# Patient Record
Sex: FEMALE | Race: WHITE | NOT HISPANIC OR LATINO | Employment: FULL TIME | URBAN - METROPOLITAN AREA
[De-identification: names, ages, dates, MRNs, and addresses within clinical notes are randomized per-mention and may not be internally consistent; named-entity substitution may affect disease eponyms.]

---

## 2017-12-13 ENCOUNTER — OFFICE VISIT (OUTPATIENT)
Dept: URGENT CARE | Facility: CLINIC | Age: 32
End: 2017-12-13
Payer: COMMERCIAL

## 2017-12-13 PROCEDURE — 99203 OFFICE O/P NEW LOW 30 MIN: CPT

## 2018-01-23 VITALS
HEIGHT: 64 IN | DIASTOLIC BLOOD PRESSURE: 88 MMHG | WEIGHT: 178 LBS | SYSTOLIC BLOOD PRESSURE: 130 MMHG | OXYGEN SATURATION: 100 % | BODY MASS INDEX: 30.39 KG/M2 | TEMPERATURE: 98.6 F | HEART RATE: 85 BPM | RESPIRATION RATE: 16 BRPM

## 2018-01-24 NOTE — PROGRESS NOTES
Assessment    1  Acute frontal sinusitis (461 1) (J01 10)    Plan  Acute frontal sinusitis    · Amoxicillin 875 MG Oral Tablet; TAKE 1 TABLET EVERY 12 HOURS DAILY    Discussion/Summary  Discussion Summary:   Discussed dx of sinusitis and will treat with amoxicillin and follow up with PCP in 3-5 days  Medication Side Effects Reviewed: Possible side effects of new medications were reviewed with the patient/guardian today  Understands and agrees with treatment plan: The treatment plan was reviewed with the patient/guardian  The patient/guardian understands and agrees with the treatment plan   Counseling Documentation With Imm: The patient was counseled regarding instructions for management, patient and family education, importance of compliance with treatment  total time of encounter was 25 minutes and 10 minutes was spent counseling  Follow Up Instructions: Follow Up with your Primary Care Provider in 3-5 days  If your symptoms worsen, go to the nearest [place] [address] Emergency Department  Chief Complaint    1  Cold Symptoms  Chief Complaint Free Text Note Form: Cough and sinus pressure x 1 week  11 weeks gestation      History of Present Illness  HPI: 28year old female at urgent care with chief complaint of cough sinus pressure and congestion for 1 week has not used nay OTC medications   Hospital Based Practices Required Assessment:   Pain Assessment   the patient states they have pain  The pain is located in the sinus  The patient describes the pain as dull  (on a scale of 0 to 10, the patient rates the pain at 3 )   Abuse And Domestic Violence Screen    Yes, the patient is safe at home  The patient states no one is hurting them  Depression And Suicide Screen  No, the patient has not had thoughts of hurting themself  No, the patient has not felt depressed in the past 7 days  Readiness To Learn: Receptive  Barriers To Learning: none     Preferred Learning: verbal   Education Completed: disease/condition   Teaching Method: verbal   Person Taught: patient   Evaluation Of Learning: verbalized/demonstrated understanding      Review of Systems  Focused-Female:   Constitutional: No fever, no chills, feels well, no tiredness, no recent weight gain or loss  ENT: nasal discharge, but as noted in HPI  Cardiovascular: no complaints of slow or fast heart rate, no chest pain, no palpitations, no leg claudication or lower extremity edema  Respiratory: no complaints of shortness of breath, no wheezing, no dyspnea on exertion, no orthopnea or PND  Breasts: no complaints of breast pain, breast lump or nipple discharge  Gastrointestinal: no complaints of abdominal pain, no constipation, no nausea or diarrhea, no vomiting, no bloody stools  Genitourinary: no complaints of dysuria, no incontinence, no pelvic pain, no dysmenorrhea, no vaginal discharge or abnormal vaginal bleeding  Musculoskeletal: no complaints of arthralgia, no myalgia, no joint swelling or stiffness, no limb pain or swelling  Integumentary: no complaints of skin rash or lesion, no itching or dry skin, no skin wounds  Neurological: no complaints of headache, no confusion, no numbness or tingling, no dizziness or fainting  ROS Reviewed:   ROS reviewed  Past Medical History  Active Problems And Past Medical History Reviewed: The active problems and past medical history were reviewed and updated today  Family History  Family History Reviewed: The family history was reviewed and updated today  Social History  Social History Reviewed: The social history was reviewed and updated today  The social history was reviewed and is unchanged  Surgical History  Surgical History Reviewed: The surgical history was reviewed and updated today  Current Meds   1  Levothyroxine Sodium 75 MCG Oral Tablet; Therapy: (Recorded:[id number]) to Recorded   2  PrenaPlus TABS;    Therapy: (Recorded:[id number]) to Recorded  Medication List Reviewed: The medication list was reviewed and updated today  Allergies    1  Cefzil TABS   2  Dimetapp ELIX   3  Ibuprofen 200 TABS    Vitals  Signs   Recorded: [id number] 02:57PM   Temperature: 98 6 F  Heart Rate: 85  Respiration: 16  Systolic: 246  Diastolic: 88  Height: 5 ft 4 in  Weight: 178 lb   BMI Calculated: 30 55  BSA Calculated: 1 86  O2 Saturation: 100    Physical Exam    Constitutional   General appearance: No acute distress, well appearing and well nourished  Eyes   Conjunctiva and lids: No swelling, erythema or discharge  Pupils and irises: Equal, round and reactive to light  Ears, Nose, Mouth, and Throat   External inspection of ears and nose: Normal     Otoscopic examination: Tympanic membranes translucent with normal light reflex  Canals patent without erythema  Nasal mucosa, septum, and turbinates: Abnormal   normal nasal septum, no intranasal masses or polyps and normal nasal turbinates  There was a purulent discharge from both nares  The bilateral nasal mucosa was boggy and edematous  Oropharynx: Normal with no erythema, edema, exudate or lesions  Pulmonary   Respiratory effort: No increased work of breathing or signs of respiratory distress  Auscultation of lungs: Clear to auscultation  Cardiovascular   Palpation of heart: Normal PMI, no thrills  Auscultation of heart: Normal rate and rhythm, normal S1 and S2, without murmurs  Lymphatic   Palpation of lymph nodes in neck: Abnormal   bilateral mobile anterior cervical node enlargement     Musculoskeletal   Gait and station: Normal     Psychiatric   Orientation to person, place, and time: Normal     Mood and affect: Normal        Signatures   Electronically signed by : Maribel Lambert NP; Dec 13 2017  3:07PM EST                       (Author)    Electronically signed by : LAVERNE Alcantar ; Dec 14 2017 12:38PM EST                       (Co-author)

## 2018-09-07 ENCOUNTER — APPOINTMENT (OUTPATIENT)
Dept: LAB | Facility: HOSPITAL | Age: 33
End: 2018-09-07
Payer: COMMERCIAL

## 2018-09-07 ENCOUNTER — TRANSCRIBE ORDERS (OUTPATIENT)
Dept: ADMINISTRATIVE | Facility: HOSPITAL | Age: 33
End: 2018-09-07

## 2018-09-07 LAB — TSH SERPL DL<=0.05 MIU/L-ACNC: 0.54 UIU/ML (ref 0.36–3.74)

## 2018-09-07 PROCEDURE — 84443 ASSAY THYROID STIM HORMONE: CPT

## 2018-09-07 PROCEDURE — 36415 COLL VENOUS BLD VENIPUNCTURE: CPT

## 2018-11-19 ENCOUNTER — OFFICE VISIT (OUTPATIENT)
Dept: URGENT CARE | Facility: CLINIC | Age: 33
End: 2018-11-19
Payer: COMMERCIAL

## 2018-11-19 VITALS
RESPIRATION RATE: 20 BRPM | HEIGHT: 60 IN | TEMPERATURE: 97 F | BODY MASS INDEX: 36.71 KG/M2 | OXYGEN SATURATION: 99 % | DIASTOLIC BLOOD PRESSURE: 66 MMHG | SYSTOLIC BLOOD PRESSURE: 102 MMHG | HEART RATE: 94 BPM | WEIGHT: 187 LBS

## 2018-11-19 DIAGNOSIS — J01.40 ACUTE NON-RECURRENT PANSINUSITIS: Primary | ICD-10-CM

## 2018-11-19 PROCEDURE — 99213 OFFICE O/P EST LOW 20 MIN: CPT | Performed by: NURSE PRACTITIONER

## 2018-11-19 RX ORDER — AMOXICILLIN 500 MG/1
500 CAPSULE ORAL EVERY 8 HOURS SCHEDULED
Qty: 21 CAPSULE | Refills: 0 | Status: SHIPPED | COMMUNITY
Start: 2018-11-19 | End: 2018-11-26

## 2018-11-19 NOTE — PATIENT INSTRUCTIONS
Rhinosinusitis   WHAT YOU NEED TO KNOW:   Rhinosinusitis (RS) is inflammation of your nose and sinuses  It commonly begins as a virus, often as a common cold  Viruses usually last 7 to 10 days and do not need treatment  When the virus does not get better on its own, you may have bacterial RS  This means that bacteria have begun to grow inside your sinuses  Acute RS lasts less than 4 weeks  Chronic RS lasts 12 weeks or more  Recurrent RS is when you have 4 or more episodes of RS in one year  DISCHARGE INSTRUCTIONS:   Return to the emergency department if:   · Your eye and eyelid are red, swollen, and painful  · You cannot open your eye  · You have double vision or you cannot see  · Your eyeball bulges out or you cannot move your eye  · You are more sleepy than normal or you notice changes in your ability to think, move, or talk  · You have a stiff neck, a fever, or a bad headache  · You have swelling of your forehead or scalp  Contact your healthcare provider if:   · Your symptoms are worse or do not improve after 3 to 5 days of treatment  · You have questions or concerns about your condition or care  Medicines: You may need any of the following:  · Acetaminophen  decreases pain and fever  It is available without a doctor's order  Ask how much to take and how often to take it  Follow directions  Acetaminophen can cause liver damage if not taken correctly  · NSAIDs , such as ibuprofen, help decrease swelling, pain, and fever  This medicine is available with or without a doctor's order  NSAIDs can cause stomach bleeding or kidney problems in certain people  If you take blood thinner medicine, always ask your healthcare provider if NSAIDs are safe for you  Always read the medicine label and follow directions  · Nasal steroid sprays  decrease inflammation in your nose and sinuses  · Decongestants  reduce swelling and drain mucus in the nose and sinuses   They may help you breathe easier  · Antihistamines  dry mucus in the nose and relieve sneezing  · Antibiotics  treat a bacterial infection and may be needed if your symptoms do not improve or they get worse  · Take your medicine as directed  Contact your healthcare provider if you think your medicine is not helping or if you have side effects  Tell him or her if you are allergic to any medicine  Keep a list of the medicines, vitamins, and herbs you take  Include the amounts, and when and why you take them  Bring the list or the pill bottles to follow-up visits  Carry your medicine list with you in case of an emergency  Self-care:   · Rinse your sinuses  Use a sinus rinse device to rinse your nasal passages with a saline (salt water) solution  This will help thin the mucus in your nose and rinse away pollen and dirt  It will also help reduce swelling so you can breathe normally  Ask your healthcare provider how often to do this  · Breathe in steam   Heat a bowl of water until you see steam  Lean over the bowl and make a tent over your head with a large towel  Breathe deeply for about 20 minutes  Be careful not to get too close to the steam or burn yourself  Do this 3 times a day  You can also breathe deeply when you take a hot shower  · Sleep with your head elevated  Place an extra pillow under your head before you go to sleep to help your sinuses drain  · Drink liquids as directed  Ask your healthcare provider how much liquid to drink each day and which liquids are best for you  Liquids will thin the mucus in your nose and help it drain  Avoid drinks that contain alcohol or caffeine  · Do not smoke, and avoid secondhand smoke  Nicotine and other chemicals in cigarettes and cigars can make your symptoms worse  Ask your healthcare provider for information if you currently smoke and need help to quit  E-cigarettes or smokeless tobacco still contain nicotine   Talk to your healthcare provider before you use these products  Follow up with your healthcare provider as directed: Follow up if your symptoms are worse or not better after 3 to 5 days of treatment  Write down your questions so you remember to ask them during your visits  © 2017 [employer] Information is for End User's use only and may not be sold, redistributed or otherwise used for commercial purposes  All illustrations and images included in CareNotes® are the copyrighted property of [employer]  or [employer]  The above information is an  only  It is not intended as medical advice for individual conditions or treatments  Talk to your doctor, nurse or pharmacist before following any medical regimen to see if it is safe and effective for you

## 2018-11-19 NOTE — PROGRESS NOTES
[facility]        NAME: Jere Moreno is a 35 y o  female  : 1985    MRN: [id number]  DATE: 2018  TIME: 3:15 PM    Assessment and Plan   Acute non-recurrent pansinusitis [J01 40]  1  Acute non-recurrent pansinusitis  amoxicillin (AMOXIL) 500 mg capsule         Patient Instructions   Saline nasal spray as needed  Flonase nasal spray as directed  Follow up with PCP in 3-5 days  Proceed to  ER if symptoms worsen  Chief Complaint     Chief Complaint   Patient presents with    Cold Like Symptoms     cough and congestion for 5 days         History of Present Illness       Sinusitis   This is a new problem  The current episode started in the past 7 days  The problem is unchanged  Associated symptoms include congestion, headaches (Frontal), a hoarse voice, sinus pressure and swollen glands  Past treatments include saline sprays  The treatment provided no relief  Review of Systems   Review of Systems   Constitutional: Negative  HENT: Positive for congestion, hoarse voice, postnasal drip, rhinorrhea and sinus pressure  Eyes: Negative  Respiratory: Negative  Cardiovascular: Negative  Gastrointestinal: Negative  Endocrine: Negative  Genitourinary: Negative  Musculoskeletal: Negative  Neurological: Positive for headaches (Frontal)           Current Medications       Current Outpatient Prescriptions:     Cyanocobalamin (B-12 DOTS) 500 MCG TBDP, Take 2 tablets by mouth , Disp: , Rfl:     levonorgestrel (MIRENA) 20 MCG/24HR IUD, by Intrauterine route , Disp: , Rfl:     levothyroxine 75 mcg tablet, Take 75 mcg by mouth , Disp: , Rfl:     amoxicillin (AMOXIL) 500 mg capsule, Take 1 capsule (500 mg total) by mouth every 8 (eight) hours for 7 days, Disp: 21 capsule, Rfl: 0    Current Allergies     Allergies as of 2018 - Reviewed 2018   Allergen Reaction Noted    Cefprozil Rash 2016            The following portions of the patient's history were reviewed and updated as appropriate: allergies, current medications, past family history, past medical history, past social history, past surgical history and problem list      History reviewed  No pertinent past medical history  Past Surgical History:   Procedure Laterality Date    GASTRIC BYPASS         History reviewed  No pertinent family history  Medications have been verified  Objective   /66   Pulse 94   Temp (!) 97 °F (36 1 °C) (Tympanic)   Resp 20   Ht 5' (1 524 m)   Wt 84 8 kg (187 lb)   SpO2 99%   Breastfeeding? Yes   BMI 36 52 kg/m²        Physical Exam     Physical Exam   Constitutional: She is oriented to person, place, and time  She appears well-developed and well-nourished  No distress  HENT:   Head: Normocephalic and atraumatic  Right Ear: External ear normal    Left Ear: External ear normal    Nose: Mucosal edema and rhinorrhea present  Right sinus exhibits maxillary sinus tenderness and frontal sinus tenderness  Left sinus exhibits maxillary sinus tenderness and frontal sinus tenderness  Mouth/Throat: Oropharynx is clear and moist    Eyes: Pupils are equal, round, and reactive to light  Conjunctivae and EOM are normal    Neck: Normal range of motion  Neck supple  Cardiovascular: Normal rate, regular rhythm and normal heart sounds  Pulmonary/Chest: Effort normal and breath sounds normal  No respiratory distress  She has no wheezes  She has no rales  Abdominal: Soft  Bowel sounds are normal    Lymphadenopathy:     She has cervical adenopathy  Neurological: She is alert and oriented to person, place, and time  She has normal reflexes  Skin: Skin is warm and dry  She is not diaphoretic  Psychiatric: She has a normal mood and affect  Her behavior is normal  Judgment and thought content normal    Nursing note and vitals reviewed

## 2018-12-14 ENCOUNTER — OFFICE VISIT (OUTPATIENT)
Dept: URGENT CARE | Facility: CLINIC | Age: 33
End: 2018-12-14
Payer: COMMERCIAL

## 2018-12-14 VITALS
HEART RATE: 86 BPM | BODY MASS INDEX: 36.32 KG/M2 | OXYGEN SATURATION: 98 % | HEIGHT: 60 IN | SYSTOLIC BLOOD PRESSURE: 100 MMHG | DIASTOLIC BLOOD PRESSURE: 72 MMHG | WEIGHT: 185 LBS | TEMPERATURE: 97 F | RESPIRATION RATE: 20 BRPM

## 2018-12-14 DIAGNOSIS — J32.9 RHINOSINUSITIS: Primary | ICD-10-CM

## 2018-12-14 DIAGNOSIS — J31.0 RHINOSINUSITIS: Primary | ICD-10-CM

## 2018-12-14 PROCEDURE — 99213 OFFICE O/P EST LOW 20 MIN: CPT | Performed by: NURSE PRACTITIONER

## 2018-12-14 RX ORDER — IBUPROFEN 200 MG
1 CAPSULE ORAL
COMMUNITY

## 2018-12-14 RX ORDER — FOLIC ACID 1 MG/1
1 TABLET ORAL
COMMUNITY

## 2018-12-14 NOTE — PROGRESS NOTES
[facility]        NAME: Michell Calle is a 35 y o  female  : 1985    MRN: [id number]  DATE: 2018  TIME: 3:14 PM    Assessment and Plan   Rhinosinusitis [J32 9]  1  Rhinosinusitis           Patient Instructions   Flonase nasal spray as directed  Continue using Neti pot as needed  Use topical nasal decongestant for no more than 72 hours  Use Sudafed as needed for no more than 72 hours  Follow up with PCP in 3-5 days  Proceed to  ER if symptoms worsen  Chief Complaint     Chief Complaint   Patient presents with    Cough     cough,congestion for 4 weeks and right ear pain for 1 week         History of Present Illness       Year old female with a chief complaint of nasal congestion  This is been present for about a month  She was seen here a month ago and given a prescription for amoxicillin  She reports that most of her symptoms have improved in that she is no longer experiencing severe headaches and she states that the congestion is not quite as bad as it had been  She began experiencing some pain in her ears about a week ago  Right side more than left  She denies fevers  She is currently breast-feeding and not taking any over-the-counter medications besides using the Neti pot  When she was here last month I advised that she use Flonase also  She never picked it up  Review of Systems   Review of Systems   Constitutional: Negative  HENT: Positive for congestion, ear pain and postnasal drip  Eyes: Negative  Respiratory: Positive for cough (Minimal)  Negative for chest tightness, shortness of breath and wheezing  Cardiovascular: Negative  Gastrointestinal: Negative  Genitourinary: Negative  Musculoskeletal: Negative  Allergic/Immunologic: Positive for environmental allergies  Neurological: Negative  Psychiatric/Behavioral: Negative            Current Medications       Current Outpatient Prescriptions:     B Complex-Biotin-FA (SUPER B-100) TABS, Take by mouth, Disp: , Rfl:     calcium carbonate (OS-BISI) 1250 (500 Ca) MG tablet, Take 1 tablet by mouth, Disp: , Rfl:     Cholecalciferol (VITAMIN D3) 99460 units TABS, Take 1 tablet by mouth, Disp: , Rfl:     Cyanocobalamin (B-12 DOTS) 500 MCG TBDP, Take 2 tablets by mouth , Disp: , Rfl:     folic acid (FOLVITE) 1 mg tablet, Take 1 mg by mouth, Disp: , Rfl:     IRON PO, Take 1 tablet by mouth, Disp: , Rfl:     levothyroxine 75 mcg tablet, Take 25 mcg by mouth  , Disp: , Rfl:     Omega-3 Fatty Acids (FISH OIL PO), Take by mouth, Disp: , Rfl:     levonorgestrel (MIRENA) 20 MCG/24HR IUD, by Intrauterine route , Disp: , Rfl:     Current Allergies     Allergies as of 12/14/2018 - Reviewed 12/14/2018   Allergen Reaction Noted    Cefprozil Rash 04/12/2016            The following portions of the patient's history were reviewed and updated as appropriate: allergies, current medications, past family history, past medical history, past social history, past surgical history and problem list      Past Medical History:   Diagnosis Date    Disease of thyroid gland        Past Surgical History:   Procedure Laterality Date    GASTRIC BYPASS         History reviewed  No pertinent family history  Medications have been verified  Objective   /72   Pulse 86   Temp (!) 97 °F (36 1 °C) (Tympanic)   Resp 20   Ht 5' (1 524 m)   Wt 83 9 kg (185 lb)   SpO2 98%   Breastfeeding? Yes   BMI 36 13 kg/m²        Physical Exam     Physical Exam   Constitutional: She is oriented to person, place, and time  She appears well-developed and well-nourished  No distress  HENT:   Head: Normocephalic  Right Ear: Tympanic membrane is bulging  Tympanic membrane is not erythematous  A middle ear effusion is present  Left Ear: Tympanic membrane is bulging  Tympanic membrane is not erythematous  A middle ear effusion is present  Nose: Mucosal edema and rhinorrhea present   Right sinus exhibits no maxillary sinus tenderness and no frontal sinus tenderness  Left sinus exhibits no frontal sinus tenderness  Mouth/Throat: Posterior oropharyngeal erythema present  No oropharyngeal exudate or posterior oropharyngeal edema  Neck: Normal range of motion  Neck supple  Cardiovascular: Normal rate, regular rhythm and normal heart sounds  Pulmonary/Chest: Breath sounds normal  No respiratory distress  She has no wheezes  She has no rales  Abdominal: Soft  Bowel sounds are normal    Neurological: She is alert and oriented to person, place, and time  She has normal reflexes  No cranial nerve deficit  Coordination normal    Skin: Skin is warm and dry  No rash noted  She is not diaphoretic  No erythema  No pallor  Psychiatric: She has a normal mood and affect  Her behavior is normal  Judgment and thought content normal    Nursing note and vitals reviewed

## 2019-01-30 ENCOUNTER — TRANSCRIBE ORDERS (OUTPATIENT)
Dept: ADMINISTRATIVE | Facility: HOSPITAL | Age: 34
End: 2019-01-30

## 2019-01-30 DIAGNOSIS — R55 NEAR SYNCOPE: Primary | ICD-10-CM

## 2019-02-05 ENCOUNTER — HOSPITAL ENCOUNTER (OUTPATIENT)
Dept: NON INVASIVE DIAGNOSTICS | Facility: HOSPITAL | Age: 34
Discharge: HOME | End: 2019-02-05
Payer: COMMERCIAL

## 2019-02-05 DIAGNOSIS — R55 NEAR SYNCOPE: ICD-10-CM

## 2019-02-05 PROCEDURE — 93306 TTE W/DOPPLER COMPLETE: CPT | Performed by: INTERNAL MEDICINE

## 2019-02-05 PROCEDURE — 93306 TTE W/DOPPLER COMPLETE: CPT

## 2019-11-05 ENCOUNTER — OFFICE VISIT (OUTPATIENT)
Dept: URGENT CARE | Facility: CLINIC | Age: 34
End: 2019-11-05
Payer: COMMERCIAL

## 2019-11-05 VITALS
SYSTOLIC BLOOD PRESSURE: 130 MMHG | OXYGEN SATURATION: 99 % | WEIGHT: 169 LBS | TEMPERATURE: 97.9 F | BODY MASS INDEX: 33.18 KG/M2 | DIASTOLIC BLOOD PRESSURE: 80 MMHG | HEIGHT: 60 IN | HEART RATE: 78 BPM | RESPIRATION RATE: 18 BRPM

## 2019-11-05 DIAGNOSIS — J01.90 ACUTE SINUSITIS, RECURRENCE NOT SPECIFIED, UNSPECIFIED LOCATION: Primary | ICD-10-CM

## 2019-11-05 PROCEDURE — 99213 OFFICE O/P EST LOW 20 MIN: CPT | Performed by: PHYSICIAN ASSISTANT

## 2019-11-05 RX ORDER — PREDNISONE 10 MG/1
TABLET ORAL
Qty: 26 TABLET | Refills: 0 | Status: SHIPPED | OUTPATIENT
Start: 2019-11-05 | End: 2019-12-22 | Stop reason: ALTCHOICE

## 2019-11-05 RX ORDER — AMOXICILLIN AND CLAVULANATE POTASSIUM 875; 125 MG/1; MG/1
1 TABLET, FILM COATED ORAL EVERY 12 HOURS SCHEDULED
Qty: 20 TABLET | Refills: 0 | Status: SHIPPED | OUTPATIENT
Start: 2019-11-05 | End: 2019-11-15

## 2019-11-06 NOTE — PROGRESS NOTES
[facility]    NAME: Tenzin Hough is a 29 y o  female  : 1985    MRN: [id number]  DATE: 2019  TIME: 8:57 PM    Assessment and Plan   Acute sinusitis, recurrence not specified, unspecified location [J01 90]  1  Acute sinusitis, recurrence not specified, unspecified location  amoxicillin-clavulanate (AUGMENTIN) 875-125 mg per tablet    predniSONE 10 mg tablet       Patient Instructions     Patient Instructions   I have prescribed an antibiotic for the infection  Please take the antibiotic as prescribed and finish the entire prescription  I recommend that the patient takes an over the counter probiotic or eats yogurt with live cultures in it [place]) to keep good bacteria in the gut and help prevent diarrhea  Wash hands frequently to prevent the spread of infection  Can use over the counter cough and cold medications to help with symptoms  Ibuprofen and/or tylenol as needed for pain or fever  If not improving over the next 7-10 days, follow up with PCP  Chief Complaint     Chief Complaint   Patient presents with    Cold Like Symptoms     Patient c/o stuffy nose, cough, headache, and chest tightness x 2 weeks       History of Present Illness   51-year-old female here with complaint of sinus congestion, cough, nasal congestion for the last 2 weeks  States she has been using over-the-counter medications with no relief  No fever chills  Review of Systems   Review of Systems   Constitutional: Negative for appetite change, chills and fever  HENT: Positive for congestion, postnasal drip and sinus pressure  Negative for ear discharge, ear pain, facial swelling, sneezing and sore throat  Respiratory: Positive for cough  Negative for shortness of breath and wheezing  Neurological: Positive for headaches         Current Medications     Current Outpatient Medications:     B Complex-Biotin-FA (SUPER B-100) TABS, Take by mouth, Disp: , Rfl:     calcium carbonate (OS-BISI) 1250 (500 Ca) MG tablet, Take 1 tablet by mouth, Disp: , Rfl:     Cholecalciferol (VITAMIN D3) 22800 units TABS, Take 1 tablet by mouth, Disp: , Rfl:     Cyanocobalamin (B-12 DOTS) 500 MCG TBDP, Take 2 tablets by mouth , Disp: , Rfl:     folic acid (FOLVITE) 1 mg tablet, Take 1 mg by mouth, Disp: , Rfl:     levonorgestrel (MIRENA) 20 MCG/24HR IUD, by Intrauterine route , Disp: , Rfl:     amoxicillin-clavulanate (AUGMENTIN) 875-125 mg per tablet, Take 1 tablet by mouth every 12 (twelve) hours for 10 days, Disp: 20 tablet, Rfl: 0    IRON PO, Take 1 tablet by mouth, Disp: , Rfl:     levothyroxine 75 mcg tablet, Take 25 mcg by mouth  , Disp: , Rfl:     Omega-3 Fatty Acids (FISH OIL PO), Take by mouth, Disp: , Rfl:     predniSONE 10 mg tablet, Take 3 tabs BID X 2 days, 2 tabs BID X 2 days, 1 tab BID X 2 days, 1 tab daily X 2 days, Disp: 26 tablet, Rfl: 0    Current Allergies     Allergies as of 11/05/2019 - Reviewed 11/05/2019   Allergen Reaction Noted    Brompheniramine-phenylephrine  12/13/2017    Cephapirin Hives 05/28/2013    Ibuprofen  04/03/2009    Pseudoephedrine  03/26/2002    Brompheniramine Palpitations 07/26/2013    Cefprozil Rash 07/26/2013    Cephalosporins Rash 05/13/2005          The following portions of the patient's history were reviewed and updated as appropriate: allergies, current medications, past family history, past medical history, past social history, past surgical history and problem list    Past Medical History:   Diagnosis Date    Disease of thyroid gland      Past Surgical History:   Procedure Laterality Date    GASTRIC BYPASS       Family History   Problem Relation Age of Onset    Autoimmune disease Mother     No Known Problems Father      Social History     Socioeconomic History    Marital status: /Civil Union     Spouse name: Not on file    Number of children: Not on file    Years of education: Not on file    Highest education level: Not on file Occupational History    Not on file   Social Needs    Financial resource strain: Not on file    Food insecurity:     Worry: Not on file     Inability: Not on file    Transportation needs:     Medical: Not on file     Non-medical: Not on file   Tobacco Use    Smoking status: Never Smoker    Smokeless tobacco: Never Used   Substance and Sexual Activity    Alcohol use: No    Drug use: No    Sexual activity: Not on file   Lifestyle    Physical activity:     Days per week: Not on file     Minutes per session: Not on file    Stress: Not on file   Relationships    Social connections:     Talks on phone: Not on file     Gets together: Not on file     Attends Judaism service: Not on file     Active member of club or organization: Not on file     Attends meetings of clubs or organizations: Not on file     Relationship status: Not on file    Intimate partner violence:     Fear of current or ex partner: Not on file     Emotionally abused: Not on file     Physically abused: Not on file     Forced sexual activity: Not on file   Other Topics Concern    Not on file   Social History Narrative    Not on file     Medications have been verified  Objective   /80   Pulse 78   Temp 97 9 °F (36 6 °C) (Tympanic)   Resp 18   Ht 5' (1 524 m)   Wt 76 7 kg (169 lb)   LMP 10/15/2019   SpO2 99%   BMI 33 01 kg/m²      Physical Exam   Physical Exam   Constitutional: She appears well-developed and well-nourished  No distress  HENT:   Head: Normocephalic and atraumatic  Right Ear: A middle ear effusion is present  Left Ear: A middle ear effusion is present  Nose: Mucosal edema present  No rhinorrhea  Right sinus exhibits maxillary sinus tenderness  Right sinus exhibits no frontal sinus tenderness  Left sinus exhibits maxillary sinus tenderness  Left sinus exhibits no frontal sinus tenderness  Mouth/Throat: Posterior oropharyngeal erythema present  No oropharyngeal exudate or posterior oropharyngeal edema  Eyes: Conjunctivae are normal    Cardiovascular: Normal rate, regular rhythm and normal heart sounds  No murmur heard  Pulmonary/Chest: Effort normal and breath sounds normal    Nursing note and vitals reviewed

## 2019-12-22 ENCOUNTER — OFFICE VISIT (OUTPATIENT)
Dept: URGENT CARE | Facility: CLINIC | Age: 34
End: 2019-12-22
Payer: COMMERCIAL

## 2019-12-22 VITALS
HEART RATE: 69 BPM | TEMPERATURE: 97.8 F | OXYGEN SATURATION: 97 % | DIASTOLIC BLOOD PRESSURE: 82 MMHG | SYSTOLIC BLOOD PRESSURE: 128 MMHG | RESPIRATION RATE: 18 BRPM

## 2019-12-22 DIAGNOSIS — J01.41 ACUTE RECURRENT PANSINUSITIS: Primary | ICD-10-CM

## 2019-12-22 PROCEDURE — 99213 OFFICE O/P EST LOW 20 MIN: CPT | Performed by: NURSE PRACTITIONER

## 2019-12-22 RX ORDER — PREDNISONE 10 MG/1
TABLET ORAL
Qty: 26 TABLET | Refills: 0 | Status: SHIPPED | OUTPATIENT
Start: 2019-12-22

## 2019-12-22 RX ORDER — AMOXICILLIN AND CLAVULANATE POTASSIUM 875; 125 MG/1; MG/1
1 TABLET, FILM COATED ORAL EVERY 12 HOURS SCHEDULED
Qty: 20 TABLET | Refills: 0 | Status: SHIPPED | OUTPATIENT
Start: 2019-12-22 | End: 2020-01-01

## 2019-12-22 NOTE — PATIENT INSTRUCTIONS
Take the augmentin and prednisone as ordered until completed  Eat yogurt or take a probiotic to restore good bacteria to your gut  Over the counter medications may be used to treat symptoms  Sinusitis   AMBULATORY CARE:   Sinusitis  is inflammation or infection of your sinuses  It is most often caused by a virus  Acute sinusitis may last up to 12 weeks  Chronic sinusitis lasts longer than 12 weeks  Recurrent sinusitis means you have 4 or more times in 1 year  Common symptoms include the following:   · Fever    · Pain, pressure, redness, or swelling around the forehead, cheeks, or eyes    · Thick yellow or green discharge from your nose    · Tenderness when you touch your face over your sinuses    · Dry cough that happens mostly at night or when you lie down    · Headache and face pain that is worse when you lean forward    · Tooth pain, or pain when you chew  Seek care immediately if:   · Your eye and eyelid are red, swollen, and painful  · You cannot open your eye  · You have vision changes, such as double vision  · Your eyeball bulges out or you cannot move your eye  · You are more sleepy than normal, or you notice changes in your ability to think, move, or talk  · You have a stiff neck, a fever, or a bad headache  · You have swelling of your forehead or scalp  Contact your healthcare provider if:   · Your symptoms do not improve after 3 days  · Your symptoms do not go away after 10 days  · You have nausea and are vomiting  · Your nose is bleeding  · You have questions or concerns about your condition or care  Treatment for sinusitis:  Your symptoms may go away on their own  Your healthcare provider may recommend watchful waiting for up to 10 days before starting antibiotics  You may  need any of the following:  · Acetaminophen  decreases pain and fever  It is available without a doctor's order  Ask how much to take and how often to take it  Follow directions   Read the labels of all other medicines you are using to see if they also contain acetaminophen, or ask your doctor or pharmacist  Acetaminophen can cause liver damage if not taken correctly  Do not use more than 4 grams (4,000 milligrams) total of acetaminophen in one day  · NSAIDs , such as ibuprofen, help decrease swelling, pain, and fever  This medicine is available with or without a doctor's order  NSAIDs can cause stomach bleeding or kidney problems in certain people  If you take blood thinner medicine, always ask your healthcare provider if NSAIDs are safe for you  Always read the medicine label and follow directions  · Nasal steroid sprays  may help decrease inflammation in your nose and sinuses  · Decongestants  help reduce swelling and drain mucus in the nose and sinuses  They may help you breathe easier  · Antihistamines  help dry mucus in the nose and relieve sneezing  · Antibiotics  help treat or prevent a bacterial infection  · Take your medicine as directed  Contact your healthcare provider if you think your medicine is not helping or if you have side effects  Tell him or her if you are allergic to any medicine  Keep a list of the medicines, vitamins, and herbs you take  Include the amounts, and when and why you take them  Bring the list or the pill bottles to follow-up visits  Carry your medicine list with you in case of an emergency  Self-care:   · Rinse your sinuses  Use a sinus rinse device to rinse your nasal passages with a saline (salt water) solution or distilled water  Do not use tap water  This will help thin the mucus in your nose and rinse away pollen and dirt  It will also help reduce swelling so you can breathe normally  Ask your healthcare provider how often to do this  · Breathe in steam   Heat a bowl of water until you see steam  Lean over the bowl and make a tent over your head with a large towel  Breathe deeply for about 20 minutes   Be careful not to get too close to the steam or burn yourself  Do this 3 times a day  You can also breathe deeply when you take a hot shower  · Sleep with your head elevated  Place an extra pillow under your head before you go to sleep to help your sinuses drain  · Drink liquids as directed  Ask your healthcare provider how much liquid to drink each day and which liquids are best for you  Liquids will thin the mucus in your nose and help it drain  Avoid drinks that contain alcohol or caffeine  · Do not smoke, and avoid secondhand smoke  Nicotine and other chemicals in cigarettes and cigars can make your symptoms worse  Ask your healthcare provider for information if you currently smoke and need help to quit  E-cigarettes or smokeless tobacco still contain nicotine  Talk to your healthcare provider before you use these products  Prevent the spread of germs that cause sinusitis:  Wash your hands often with soap and water  Wash your hands after you use the bathroom, change a child's diaper, or sneeze  Wash your hands before you prepare or eat food  Follow up with your healthcare provider as directed: You may be referred to an ear, nose, and throat specialist  Write down your questions so you remember to ask them during your visits  © 2017 [address] Information is for End User's use only and may not be sold, redistributed or otherwise used for commercial purposes  All illustrations and images included in CareNotes® are the copyrighted property of [employer]  or [employer]  The above information is an  only  It is not intended as medical advice for individual conditions or treatments  Talk to your doctor, nurse or pharmacist before following any medical regimen to see if it is safe and effective for you

## 2019-12-22 NOTE — PROGRESS NOTES
[facility]        NAME: Yariel Nazario is a 29 y o  female  : 1985    MRN: [id number]  DATE: 2019  TIME: 1:14 PM    Assessment and Plan   Acute recurrent pansinusitis [J01 41]  1  Acute recurrent pansinusitis  predniSONE 10 mg tablet    amoxicillin-clavulanate (AUGMENTIN) 875-125 mg per tablet         Patient Instructions     Patient Instructions     Take the augmentin and prednisone as ordered until completed  Eat yogurt or take a probiotic to restore good bacteria to your gut  Over the counter medications may be used to treat symptoms  Sinusitis   AMBULATORY CARE:   Sinusitis  is inflammation or infection of your sinuses  It is most often caused by a virus  Acute sinusitis may last up to 12 weeks  Chronic sinusitis lasts longer than 12 weeks  Recurrent sinusitis means you have 4 or more times in 1 year  Common symptoms include the following:   · Fever    · Pain, pressure, redness, or swelling around the forehead, cheeks, or eyes    · Thick yellow or green discharge from your nose    · Tenderness when you touch your face over your sinuses    · Dry cough that happens mostly at night or when you lie down    · Headache and face pain that is worse when you lean forward    · Tooth pain, or pain when you chew  Seek care immediately if:   · Your eye and eyelid are red, swollen, and painful  · You cannot open your eye  · You have vision changes, such as double vision  · Your eyeball bulges out or you cannot move your eye  · You are more sleepy than normal, or you notice changes in your ability to think, move, or talk  · You have a stiff neck, a fever, or a bad headache  · You have swelling of your forehead or scalp  Contact your healthcare provider if:   · Your symptoms do not improve after 3 days  · Your symptoms do not go away after 10 days  · You have nausea and are vomiting  · Your nose is bleeding      · You have questions or concerns about your condition or care  Treatment for sinusitis:  Your symptoms may go away on their own  Your healthcare provider may recommend watchful waiting for up to 10 days before starting antibiotics  You may  need any of the following:  · Acetaminophen  decreases pain and fever  It is available without a doctor's order  Ask how much to take and how often to take it  Follow directions  Read the labels of all other medicines you are using to see if they also contain acetaminophen, or ask your doctor or pharmacist  Acetaminophen can cause liver damage if not taken correctly  Do not use more than 4 grams (4,000 milligrams) total of acetaminophen in one day  · NSAIDs , such as ibuprofen, help decrease swelling, pain, and fever  This medicine is available with or without a doctor's order  NSAIDs can cause stomach bleeding or kidney problems in certain people  If you take blood thinner medicine, always ask your healthcare provider if NSAIDs are safe for you  Always read the medicine label and follow directions  · Nasal steroid sprays  may help decrease inflammation in your nose and sinuses  · Decongestants  help reduce swelling and drain mucus in the nose and sinuses  They may help you breathe easier  · Antihistamines  help dry mucus in the nose and relieve sneezing  · Antibiotics  help treat or prevent a bacterial infection  · Take your medicine as directed  Contact your healthcare provider if you think your medicine is not helping or if you have side effects  Tell him or her if you are allergic to any medicine  Keep a list of the medicines, vitamins, and herbs you take  Include the amounts, and when and why you take them  Bring the list or the pill bottles to follow-up visits  Carry your medicine list with you in case of an emergency  Self-care:   · Rinse your sinuses  Use a sinus rinse device to rinse your nasal passages with a saline (salt water) solution or distilled water  Do not use tap water   This will help thin the mucus in your nose and rinse away pollen and dirt  It will also help reduce swelling so you can breathe normally  Ask your healthcare provider how often to do this  · Breathe in steam   Heat a bowl of water until you see steam  Lean over the bowl and make a tent over your head with a large towel  Breathe deeply for about 20 minutes  Be careful not to get too close to the steam or burn yourself  Do this 3 times a day  You can also breathe deeply when you take a hot shower  · Sleep with your head elevated  Place an extra pillow under your head before you go to sleep to help your sinuses drain  · Drink liquids as directed  Ask your healthcare provider how much liquid to drink each day and which liquids are best for you  Liquids will thin the mucus in your nose and help it drain  Avoid drinks that contain alcohol or caffeine  · Do not smoke, and avoid secondhand smoke  Nicotine and other chemicals in cigarettes and cigars can make your symptoms worse  Ask your healthcare provider for information if you currently smoke and need help to quit  E-cigarettes or smokeless tobacco still contain nicotine  Talk to your healthcare provider before you use these products  Prevent the spread of germs that cause sinusitis:  Wash your hands often with soap and water  Wash your hands after you use the bathroom, change a child's diaper, or sneeze  Wash your hands before you prepare or eat food  Follow up with your healthcare provider as directed: You may be referred to an ear, nose, and throat specialist  Write down your questions so you remember to ask them during your visits  © 2017 [address] Information is for End User's use only and may not be sold, redistributed or otherwise used for commercial purposes  All illustrations and images included in CareNotes® are the copyrighted property of A [employer] M [employer] Inc  or [facility]  The above information is an  only   It is not intended as medical advice for individual conditions or treatments  Talk to your doctor, nurse or pharmacist before following any medical regimen to see if it is safe and effective for you  Follow up with PCP in 3-5 days  Proceed to  ER if symptoms worsen  Chief Complaint     Chief Complaint   Patient presents with    Cough     Pt c/o a cough and sore throat for a week  History of Present Illness       Patient reports sinus congestion, postnasal drip, ear pressure, sore throat and cough x1 week getting worse not better  The symptoms are not relieved by over-the-counter medications  She presents to be seen      Review of Systems   Review of Systems   HENT: Positive for congestion, ear pain, rhinorrhea, sinus pressure, sinus pain and sore throat  Negative for ear discharge  Respiratory: Positive for cough  Negative for chest tightness, shortness of breath and wheezing  Gastrointestinal: Positive for nausea  Negative for abdominal pain, diarrhea and vomiting  All other systems reviewed and are negative          Current Medications       Current Outpatient Medications:     amoxicillin-clavulanate (AUGMENTIN) 875-125 mg per tablet, Take 1 tablet by mouth every 12 (twelve) hours for 10 days, Disp: 20 tablet, Rfl: 0    B Complex-Biotin-FA (SUPER B-100) TABS, Take by mouth, Disp: , Rfl:     calcium carbonate (OS-BISI) 1250 (500 Ca) MG tablet, Take 1 tablet by mouth, Disp: , Rfl:     Cholecalciferol (VITAMIN D3) 58637 units TABS, Take 1 tablet by mouth, Disp: , Rfl:     Cyanocobalamin (B-12 DOTS) 500 MCG TBDP, Take 2 tablets by mouth , Disp: , Rfl:     folic acid (FOLVITE) 1 mg tablet, Take 1 mg by mouth, Disp: , Rfl:     IRON PO, Take 1 tablet by mouth, Disp: , Rfl:     levonorgestrel (MIRENA) 20 MCG/24HR IUD, by Intrauterine route , Disp: , Rfl:     levothyroxine 75 mcg tablet, Take 25 mcg by mouth  , Disp: , Rfl:     Omega-3 Fatty Acids (FISH OIL PO), Take by mouth, Disp: , Rfl:     predniSONE 10 mg tablet, Take 3 tabs BID X 2 days, 2 tabs BID X 2 days, 1 tab BID X 2 days, 1 tab daily X 2 days, Disp: 26 tablet, Rfl: 0    Current Allergies     Allergies as of 12/22/2019 - Reviewed 12/22/2019   Allergen Reaction Noted    Brompheniramine-phenylephrine  12/13/2017    Cephapirin Hives 05/28/2013    Ibuprofen  04/03/2009    Pseudoephedrine  03/26/2002    Brompheniramine Palpitations 07/26/2013    Cefprozil Rash 07/26/2013    Cephalosporins Rash 05/13/2005            The following portions of the patient's history were reviewed and updated as appropriate: allergies, current medications, past family history, past medical history, past social history, past surgical history and problem list      Past Medical History:   Diagnosis Date    Disease of thyroid gland        Past Surgical History:   Procedure Laterality Date    GASTRIC BYPASS         Family History   Problem Relation Age of Onset    Autoimmune disease Mother     No Known Problems Father          Medications have been verified  Objective   /82   Pulse 69   Temp 97 8 °F (36 6 °C)   Resp 18   SpO2 97%        Physical Exam     Physical Exam   Constitutional: She is oriented to person, place, and time  She appears well-developed and well-nourished  No distress  HENT:   Head: Normocephalic and atraumatic  Right Ear: Hearing, tympanic membrane, external ear and ear canal normal    Left Ear: Hearing, tympanic membrane, external ear and ear canal normal    Nose: Mucosal edema, rhinorrhea and sinus tenderness present  Right sinus exhibits maxillary sinus tenderness and frontal sinus tenderness  Left sinus exhibits maxillary sinus tenderness and frontal sinus tenderness  Mouth/Throat: Uvula is midline and mucous membranes are normal  Posterior oropharyngeal erythema present  No oropharyngeal exudate, posterior oropharyngeal edema or tonsillar abscesses  Tonsils are 1+ on the right  Tonsils are 1+ on the left   No tonsillar exudate  Eyes: Pupils are equal, round, and reactive to light  Neck: Normal range of motion  Neck supple  Cardiovascular: Normal rate, regular rhythm and normal heart sounds  Pulmonary/Chest: Effort normal and breath sounds normal  No accessory muscle usage or stridor  No apnea, no tachypnea and no bradypnea  No respiratory distress  She has no decreased breath sounds  She has no wheezes  She has no rhonchi  She has no rales  Abdominal: Soft  Bowel sounds are normal  She exhibits no distension  There is no tenderness  Musculoskeletal: Normal range of motion  Neurological: She is alert and oriented to person, place, and time  Skin: Skin is warm and dry  Capillary refill takes less than 2 seconds  She is not diaphoretic  Psychiatric: She has a normal mood and affect  Her behavior is normal  Judgment and thought content normal    Nursing note and vitals reviewed

## 2020-06-22 ENCOUNTER — TRANSCRIBE ORDERS (OUTPATIENT)
Dept: ADMINISTRATIVE | Facility: HOSPITAL | Age: 35
End: 2020-06-22

## 2020-06-22 DIAGNOSIS — M51.24 DISPLACEMENT OF THORACIC INTERVERTEBRAL DISC WITHOUT MYELOPATHY: Primary | ICD-10-CM

## 2020-06-26 ENCOUNTER — HOSPITAL ENCOUNTER (OUTPATIENT)
Dept: MRI IMAGING | Facility: HOSPITAL | Age: 35
Discharge: HOME | End: 2020-06-26
Payer: COMMERCIAL

## 2020-06-26 DIAGNOSIS — M51.24 DISPLACEMENT OF THORACIC INTERVERTEBRAL DISC WITHOUT MYELOPATHY: ICD-10-CM

## 2020-06-26 PROCEDURE — 72148 MRI LUMBAR SPINE W/O DYE: CPT

## 2021-01-13 ENCOUNTER — IMMUNIZATIONS (OUTPATIENT)
Dept: FAMILY MEDICINE CLINIC | Facility: HOSPITAL | Age: 36
End: 2021-01-13

## 2021-01-13 DIAGNOSIS — Z23 ENCOUNTER FOR IMMUNIZATION: ICD-10-CM

## 2021-01-13 PROCEDURE — 91301 SARS-COV-2 / COVID-19 MRNA VACCINE (MODERNA) 100 MCG: CPT

## 2021-01-13 PROCEDURE — 0011A SARS-COV-2 / COVID-19 MRNA VACCINE (MODERNA) 100 MCG: CPT

## 2021-02-12 ENCOUNTER — IMMUNIZATIONS (OUTPATIENT)
Dept: FAMILY MEDICINE CLINIC | Facility: HOSPITAL | Age: 36
End: 2021-02-12

## 2021-02-12 DIAGNOSIS — Z23 ENCOUNTER FOR IMMUNIZATION: Primary | ICD-10-CM

## 2021-02-12 PROCEDURE — 91301 SARS-COV-2 / COVID-19 MRNA VACCINE (MODERNA) 100 MCG: CPT

## 2021-02-12 PROCEDURE — 0012A SARS-COV-2 / COVID-19 MRNA VACCINE (MODERNA) 100 MCG: CPT

## 2021-06-18 ENCOUNTER — OFFICE VISIT (OUTPATIENT)
Dept: URGENT CARE | Facility: CLINIC | Age: 36
End: 2021-06-18
Payer: COMMERCIAL

## 2021-06-18 VITALS
OXYGEN SATURATION: 98 % | BODY MASS INDEX: 33.01 KG/M2 | HEART RATE: 78 BPM | DIASTOLIC BLOOD PRESSURE: 77 MMHG | TEMPERATURE: 98.7 F | SYSTOLIC BLOOD PRESSURE: 121 MMHG | WEIGHT: 169 LBS | RESPIRATION RATE: 20 BRPM

## 2021-06-18 DIAGNOSIS — L23.7 POISON IVY DERMATITIS: Primary | ICD-10-CM

## 2021-06-18 PROCEDURE — 99213 OFFICE O/P EST LOW 20 MIN: CPT | Performed by: PHYSICIAN ASSISTANT

## 2021-06-18 RX ORDER — PREDNISONE 10 MG/1
TABLET ORAL
Qty: 30 TABLET | Refills: 0 | Status: SHIPPED | OUTPATIENT
Start: 2021-06-18

## 2021-06-18 NOTE — PROGRESS NOTES
[address]        NAME: Rebeka Hodge is a 28 y o  female  : 1985    MRN: [id number]  DATE: 2021  TIME: 2:20 PM    Assessment and Plan   Poison ivy dermatitis [L23 7]  1  Poison ivy dermatitis  predniSONE 10 mg tablet         Patient Instructions       Follow up with PCP in 3-5 days  Proceed to  ER if symptoms worsen  Chief Complaint     Chief Complaint   Patient presents with    Rash     neck , behind both ears , x 3 days          History of Present Illness         Patient presents with a pruritic rash on the right side of her neck and behind her ears for the past 3 days  She was exposed to poison ivy  Rash is spreading  Review of Systems   Review of Systems   Constitutional: Negative for chills and fever  Skin: Positive for rash           Current Medications       Current Outpatient Medications:     B Complex-Biotin-FA (SUPER B-100) TABS, Take by mouth, Disp: , Rfl:     calcium carbonate (OS-BISI) 1250 (500 Ca) MG tablet, Take 1 tablet by mouth, Disp: , Rfl:     Cholecalciferol (VITAMIN D3) 60181 units TABS, Take 1 tablet by mouth, Disp: , Rfl:     levothyroxine 75 mcg tablet, Take 25 mcg by mouth  , Disp: , Rfl:     Omega-3 Fatty Acids (FISH OIL PO), Take by mouth, Disp: , Rfl:     Cyanocobalamin (B-12 DOTS) 500 MCG TBDP, Take 2 tablets by mouth , Disp: , Rfl:     folic acid (FOLVITE) 1 mg tablet, Take 1 mg by mouth, Disp: , Rfl:     IRON PO, Take 1 tablet by mouth, Disp: , Rfl:     levonorgestrel (MIRENA) 20 MCG/24HR IUD, by Intrauterine route , Disp: , Rfl:     predniSONE 10 mg tablet, Take 3 tabs BID X 2 days, 2 tabs BID X 2 days, 1 tab BID X 2 days, 1 tab daily X 2 days, Disp: 26 tablet, Rfl: 0    predniSONE 10 mg tablet, 5 tabs once daily x 2 days then decrease by 1 tab every other day until finished, Disp: 30 tablet, Rfl: 0    Current Allergies     Allergies as of 2021 - Reviewed 2021   Allergen Reaction Noted    Brompheniramine-phenylephrine 12/13/2017    Cephapirin Hives 05/28/2013    Ibuprofen  04/03/2009    Pseudoephedrine  03/26/2002    Brompheniramine Palpitations 07/26/2013    Cefprozil Rash 07/26/2013    Cephalosporins Rash 05/13/2005            The following portions of the patient's history were reviewed and updated as appropriate: allergies, current medications, past family history, past medical history, past social history, past surgical history and problem list      Past Medical History:   Diagnosis Date    Disease of thyroid gland        Past Surgical History:   Procedure Laterality Date    GASTRIC BYPASS         Family History   Problem Relation Age of Onset    Autoimmune disease Mother     No Known Problems Father          Medications have been verified  Objective   /77   Pulse 78   Temp 98 7 °F (37 1 °C)   Resp 20   Wt 76 7 kg (169 lb)   LMP 06/06/2021 (Exact Date)   SpO2 98%   Breastfeeding No   BMI 33 01 kg/m²   Patient's last menstrual period was 06/06/2021 (exact date)  Physical Exam     Physical Exam  Vitals and nursing note reviewed  Constitutional:       Appearance: Normal appearance  HENT:      Head: Normocephalic and atraumatic  Cardiovascular:      Rate and Rhythm: Normal rate and regular rhythm  Pulmonary:      Effort: Pulmonary effort is normal    Skin:     General: Skin is warm  Comments: Maculopapular rash right side of neck into the right upper back here is consistent with poison ivy dermatitis  No drainage  Neurological:      Mental Status: She is alert

## 2021-06-18 NOTE — PATIENT INSTRUCTIONS
Poison Ivy   WHAT YOU NEED TO KNOW:   Poison ivy is a plant that can cause an itchy, uncomfortable rash on your skin  Poison ivy grows as a shrub or vine in woods, fields, and areas of thick [place]  It has 3 bright green leaves on each stem that turn red in demetri  DISCHARGE INSTRUCTIONS:   Medicines:   · Antiseptic or drying creams or ointments: These medicines may be used to dry out the rash and decrease the itching  These products may be available without a doctor's order  · Steroids: This medicine helps decrease itching and inflammation  It can be given as a cream to apply to your skin or as a pill  · Antihistamines: This medicine may help decrease itching and help you sleep  It is available without a doctor's order  · Take your medicine as directed  Contact your healthcare provider if you think your medicine is not helping or if you have side effects  Tell him or her if you are allergic to any medicine  Keep a list of the medicines, vitamins, and herbs you take  Include the amounts, and when and why you take them  Bring the list or the pill bottles to follow-up visits  Carry your medicine list with you in case of an emergency  Follow up with your healthcare provider as directed:  Write down your questions so you remember to ask them during your visits  How your poison ivy rash spreads: You cannot spread poison ivy by touching your rash or the liquid from your blisters  Poison ivy is spread only if you scratch your skin while it still has oil on it  You may think your rash is spreading because new rashes appear over a number of days  This happens because areas covered by thin skin break out in a rash first  Your face or forearms may develop a rash before thicker areas, such as the palms of your hands  Self-care:   · Keep your rash clean and dry:  Wash it with soap and water  Gently pat it dry with a clean towel  · Try not to scratch or rub your rash:   This can cause your skin to become infected  · Use a compress on your rash:  Dip a clean washcloth in cool water  Wring it out and place it on your rash  Leave the washcloth on your skin for 15 minutes  Do this at least 3 times per day  · Take a cornstarch or oatmeal bath: If your rash is too large to cover with wet washcloths, take 3 or 4 cornstarch baths daily  Mix 1 pound of cornstarch with a little water to make a paste  Add the paste to a tub full of water and mix well  You may also use colloidal oatmeal in the bath water  Use lukewarm water  Avoid hot water because it may cause your itching to increase  Prevent a poison ivy rash in the future:   · Wear skin protection:  Wear long pants, a long-sleeved shirt, and gloves  Use a skin block lotion to protect your skin from poison ivy oil  You can find this at a drugstore without a prescription  · Wash clothing after possible exposure: If you think you have been near a poison ivy plant, wash the clothes you were wearing separately from other clothes  Rinse the washing machine well after you take the clothes out  Scrub boots and shoes with warm, soapy water  Dry clean items and clothing that you cannot wash in water  Poison ivy oil is sticky and can stay on surfaces for a long time  It can cause a new rash even years later  · Bathe your pet:  Use warm water and shampoo on your pet's fur  This will prevent the spread of oil to your skin, car, and home  Wear long sleeves, long pants, and gloves while washing pets or any items that may have oil on them  · Reduce exposure to poison ivy:  Do not touch plants that look like poison ivy  Keep your yard free of poison ivy  While protecting your skin, remove the plant and the roots  Place them in a plastic bag and seal the bag tightly  · Do not burn poison ivy plants: This can spread the oil through the air  If you breathe the oil into your lungs, you could have swelling and serious breathing problems   Oil that clings to the fire zen can land on your skin and cause a rash  Contact your healthcare provider if:   · You have pus, soft yellow scabs, or tenderness on the rash  · The itching gets worse or keeps you awake at night  · The rash covers more than 1/4 of your skin or spreads to your eyes, mouth, or genital area  · The rash is not better after 2 to 3 weeks  · You have tender, swollen glands on the sides of your neck  · You have swelling in your arms and legs  · You have questions or concerns about your condition or care  Seek care immediately or call 911 if:   · You have a fever  · You have redness, swelling, and tenderness around the rash  · You have trouble breathing  © Copyright [address] Information is for End User's use only and may not be sold, redistributed or otherwise used for commercial purposes  All illustrations and images included in CareNotes® are the copyrighted property of A D A M , Inc  or [facility] [employer]   The above information is an  only  It is not intended as medical advice for individual conditions or treatments  Talk to your doctor, nurse or pharmacist before following any medical regimen to see if it is safe and effective for you

## 2021-11-01 ENCOUNTER — IMMUNIZATIONS (OUTPATIENT)
Dept: FAMILY MEDICINE CLINIC | Facility: HOSPITAL | Age: 36
End: 2021-11-01

## 2021-11-01 DIAGNOSIS — Z23 ENCOUNTER FOR IMMUNIZATION: Primary | ICD-10-CM

## 2021-11-19 ENCOUNTER — OFFICE VISIT (OUTPATIENT)
Dept: URGENT CARE | Facility: CLINIC | Age: 36
End: 2021-11-19
Payer: COMMERCIAL

## 2021-11-19 VITALS
TEMPERATURE: 98 F | SYSTOLIC BLOOD PRESSURE: 114 MMHG | DIASTOLIC BLOOD PRESSURE: 62 MMHG | HEART RATE: 92 BPM | OXYGEN SATURATION: 98 % | RESPIRATION RATE: 14 BRPM

## 2021-11-19 DIAGNOSIS — J01.90 ACUTE SINUSITIS, RECURRENCE NOT SPECIFIED, UNSPECIFIED LOCATION: Primary | ICD-10-CM

## 2021-11-19 PROCEDURE — 99213 OFFICE O/P EST LOW 20 MIN: CPT | Performed by: PHYSICIAN ASSISTANT

## 2021-11-19 PROCEDURE — U0005 INFEC AGEN DETEC AMPLI PROBE: HCPCS | Performed by: PHYSICIAN ASSISTANT

## 2021-11-19 PROCEDURE — U0003 INFECTIOUS AGENT DETECTION BY NUCLEIC ACID (DNA OR RNA); SEVERE ACUTE RESPIRATORY SYNDROME CORONAVIRUS 2 (SARS-COV-2) (CORONAVIRUS DISEASE [COVID-19]), AMPLIFIED PROBE TECHNIQUE, MAKING USE OF HIGH THROUGHPUT TECHNOLOGIES AS DESCRIBED BY CMS-2020-01-R: HCPCS | Performed by: PHYSICIAN ASSISTANT

## 2021-11-19 RX ORDER — AMOXICILLIN 875 MG/1
875 TABLET, COATED ORAL 2 TIMES DAILY
Qty: 20 TABLET | Refills: 0 | Status: SHIPPED | OUTPATIENT
Start: 2021-11-19 | End: 2021-11-29

## 2021-11-20 LAB — SARS-COV-2 RNA RESP QL NAA+PROBE: NEGATIVE

## 2023-06-17 ENCOUNTER — APPOINTMENT (OUTPATIENT)
Dept: LAB | Facility: HOSPITAL | Age: 38
End: 2023-06-17
Payer: COMMERCIAL

## 2023-06-17 PROCEDURE — 86706 HEP B SURFACE ANTIBODY: CPT

## 2023-07-09 ENCOUNTER — HOSPITAL ENCOUNTER (EMERGENCY)
Facility: HOSPITAL | Age: 38
Discharge: HOME | End: 2023-07-09
Attending: EMERGENCY MEDICINE
Payer: COMMERCIAL

## 2023-07-09 VITALS
OXYGEN SATURATION: 98 % | HEART RATE: 63 BPM | TEMPERATURE: 98.6 F | WEIGHT: 169 LBS | RESPIRATION RATE: 18 BRPM | BODY MASS INDEX: 33.18 KG/M2 | HEIGHT: 60 IN | DIASTOLIC BLOOD PRESSURE: 80 MMHG | SYSTOLIC BLOOD PRESSURE: 125 MMHG

## 2023-07-09 DIAGNOSIS — M79.89 LEG SWELLING: Primary | ICD-10-CM

## 2023-07-09 LAB
ANION GAP SERPL CALCULATED.3IONS-SCNC: 8 MMOL/L
APTT PPP: 24 SECONDS (ref 23–37)
BASOPHILS # BLD AUTO: 0.07 THOUSANDS/ÂΜL (ref 0–0.1)
BASOPHILS NFR BLD AUTO: 1 % (ref 0–1)
BUN SERPL-MCNC: 16 MG/DL (ref 5–25)
CALCIUM SERPL-MCNC: 9.2 MG/DL (ref 8.4–10.2)
CHLORIDE SERPL-SCNC: 105 MMOL/L (ref 96–108)
CO2 SERPL-SCNC: 24 MMOL/L (ref 21–32)
CREAT SERPL-MCNC: 0.78 MG/DL (ref 0.6–1.3)
D DIMER PPP FEU-MCNC: <0.27 UG/ML FEU
EOSINOPHIL # BLD AUTO: 0.12 THOUSAND/ÂΜL (ref 0–0.61)
EOSINOPHIL NFR BLD AUTO: 1 % (ref 0–6)
ERYTHROCYTE [DISTWIDTH] IN BLOOD BY AUTOMATED COUNT: 14.6 % (ref 11.6–15.1)
GFR SERPL CREATININE-BSD FRML MDRD: 97 ML/MIN/1.73SQ M
GLUCOSE SERPL-MCNC: 83 MG/DL (ref 65–140)
HCT VFR BLD AUTO: 38.3 % (ref 34.8–46.1)
HGB BLD-MCNC: 12.1 G/DL (ref 11.5–15.4)
IMM GRANULOCYTES # BLD AUTO: 0.07 THOUSAND/UL (ref 0–0.2)
IMM GRANULOCYTES NFR BLD AUTO: 1 % (ref 0–2)
INR PPP: 0.87 (ref 0.84–1.19)
LYMPHOCYTES # BLD AUTO: 4.44 THOUSANDS/ÂΜL (ref 0.6–4.47)
LYMPHOCYTES NFR BLD AUTO: 44 % (ref 14–44)
MCH RBC QN AUTO: 26.4 PG (ref 26.8–34.3)
MCHC RBC AUTO-ENTMCNC: 31.6 G/DL (ref 31.4–37.4)
MCV RBC AUTO: 83 FL (ref 82–98)
MONOCYTES # BLD AUTO: 0.67 THOUSAND/ÂΜL (ref 0.17–1.22)
MONOCYTES NFR BLD AUTO: 7 % (ref 4–12)
NEUTROPHILS # BLD AUTO: 4.81 THOUSANDS/ÂΜL (ref 1.85–7.62)
NEUTS SEG NFR BLD AUTO: 46 % (ref 43–75)
NRBC BLD AUTO-RTO: 0 /100 WBCS
PLATELET # BLD AUTO: 304 THOUSANDS/UL (ref 149–390)
PMV BLD AUTO: 10.3 FL (ref 8.9–12.7)
POTASSIUM SERPL-SCNC: 3.7 MMOL/L (ref 3.5–5.3)
PROTHROMBIN TIME: 11.9 SECONDS (ref 11.6–14.5)
RBC # BLD AUTO: 4.59 MILLION/UL (ref 3.81–5.12)
SODIUM SERPL-SCNC: 137 MMOL/L (ref 135–147)
WBC # BLD AUTO: 10.18 THOUSAND/UL (ref 4.31–10.16)

## 2023-07-09 PROCEDURE — 85379 FIBRIN DEGRADATION QUANT: CPT | Performed by: EMERGENCY MEDICINE

## 2023-07-09 PROCEDURE — 85730 THROMBOPLASTIN TIME PARTIAL: CPT | Performed by: EMERGENCY MEDICINE

## 2023-07-09 PROCEDURE — 80048 BASIC METABOLIC PNL TOTAL CA: CPT | Performed by: EMERGENCY MEDICINE

## 2023-07-09 PROCEDURE — 99283 EMERGENCY DEPT VISIT LOW MDM: CPT

## 2023-07-09 PROCEDURE — 85610 PROTHROMBIN TIME: CPT | Performed by: EMERGENCY MEDICINE

## 2023-07-09 PROCEDURE — 36415 COLL VENOUS BLD VENIPUNCTURE: CPT | Performed by: EMERGENCY MEDICINE

## 2023-07-09 PROCEDURE — 85025 COMPLETE CBC W/AUTO DIFF WBC: CPT | Performed by: EMERGENCY MEDICINE

## 2023-07-10 NOTE — ED PROVIDER NOTES
History  Chief Complaint   Patient presents with   • Leg Swelling     Pt c/o right leg swelling since mid last week      Patient is a 79-year-old female presenting to the emergency department complaining of swelling to her right leg that she first noted last week, she denies any injury or trauma, no pain, she does have a history of a previous ACL injury that did not require surgery, no recent long distance trips, no recent surgeries, she had COVID back in February but not more recently, she denies any chest pain or shortness of breath, no fever or chills          Prior to Admission Medications   Prescriptions Last Dose Informant Patient Reported? Taking? B Complex-Biotin-FA (SUPER B-100) TABS   Yes No   Sig: Take by mouth   Cholecalciferol (VITAMIN D3) 31100 units TABS   Yes No   Sig: Take 1 tablet by mouth   Cyanocobalamin (B-12 DOTS) 500 MCG TBDP   Yes No   Sig: Take 2 tablets by mouth. IRON PO   Yes No   Sig: Take 1 tablet by mouth   Omega-3 Fatty Acids (FISH OIL PO)   Yes No   Sig: Take by mouth   calcium carbonate (OS-BISI) 1250 (500 Ca) MG tablet   Yes No   Sig: Take 1 tablet by mouth   folic acid (FOLVITE) 1 mg tablet   Yes No   Sig: Take 1 mg by mouth   levonorgestrel (MIRENA) 20 MCG/24HR IUD   Yes No   Sig: by Intrauterine route.    levothyroxine 75 mcg tablet  Self Yes No   Sig: Take 25 mcg by mouth     predniSONE 10 mg tablet   No No   Sig: Take 3 tabs BID X 2 days, 2 tabs BID X 2 days, 1 tab BID X 2 days, 1 tab daily X 2 days   predniSONE 10 mg tablet   No No   Si tabs once daily x 2 days then decrease by 1 tab every other day until finished      Facility-Administered Medications: None       Past Medical History:   Diagnosis Date   • Disease of thyroid gland        Past Surgical History:   Procedure Laterality Date   • GASTRIC BYPASS         Family History   Problem Relation Age of Onset   • Autoimmune disease Mother    • No Known Problems Father      I have reviewed and agree with the history as documented. E-Cigarette/Vaping   • E-Cigarette Use Never User      E-Cigarette/Vaping Substances   • Nicotine No    • THC No    • CBD No    • Flavoring No    • Other No    • Unknown No      Social History     Tobacco Use   • Smoking status: Never   • Smokeless tobacco: Never   Vaping Use   • Vaping Use: Never used   Substance Use Topics   • Alcohol use: No   • Drug use: No       Review of Systems   Constitutional: Negative. HENT: Negative. Eyes: Negative. Respiratory: Negative. Cardiovascular: Positive for leg swelling. Gastrointestinal: Negative. Endocrine: Negative. Genitourinary: Negative. Musculoskeletal: Negative. Skin: Negative. Allergic/Immunologic: Negative. Neurological: Negative. Hematological: Negative. Psychiatric/Behavioral: Negative. Physical Exam  Physical Exam  Constitutional:       Appearance: She is well-developed. HENT:      Head: Normocephalic and atraumatic. Eyes:      Conjunctiva/sclera: Conjunctivae normal.      Pupils: Pupils are equal, round, and reactive to light. Cardiovascular:      Rate and Rhythm: Normal rate. Pulmonary:      Effort: Pulmonary effort is normal.   Abdominal:      Palpations: Abdomen is soft. Musculoskeletal:         General: Normal range of motion. Cervical back: Normal range of motion and neck supple. Skin:     General: Skin is warm and dry. Comments: Right lower extremity with swelling, greater circumference of the left around the calf, there is no calf tenderness, negative Homans' sign, distal sensation and motor is intact, there are no rashes or lesions, no ecchymosis or erythema   Neurological:      Mental Status: She is alert and oriented to person, place, and time.          Vital Signs  ED Triage Vitals [07/09/23 2212]   Temperature Pulse Respirations Blood Pressure SpO2   98.6 °F (37 °C) 66 17 141/91 98 %      Temp Source Heart Rate Source Patient Position - Orthostatic VS BP Location FiO2 (%)   Temporal Monitor Sitting Right arm --      Pain Score       --           Vitals:    07/09/23 2215 07/09/23 2245 07/09/23 2300 07/09/23 2315   BP: 147/95 139/78 124/79 125/80   Pulse: 63 65 62 63   Patient Position - Orthostatic VS:             Visual Acuity      ED Medications  Medications - No data to display    Diagnostic Studies  Results Reviewed     Procedure Component Value Units Date/Time    D-Dimer [[id number]]  (Normal) Collected: 07/09/23 2234    Lab Status: Final result Specimen: Blood from Arm, Left Updated: 07/09/23 2313     D-Dimer, Quant <0.27 ug/ml FEU     Basic metabolic panel [[id number]] Collected: 07/09/23 2234    Lab Status: Final result Specimen: Blood from Arm, Left Updated: 07/09/23 2313     Sodium 137 mmol/L      Potassium 3.7 mmol/L      Chloride 105 mmol/L      CO2 24 mmol/L      ANION GAP 8 mmol/L      BUN 16 mg/dL      Creatinine 0.78 mg/dL      Glucose 83 mg/dL      Calcium 9.2 mg/dL      eGFR 97 ml/min/1.73sq m     Narrative:      [place] guidelines for Chronic Kidney Disease (CKD):   •  Stage 1 with normal or high GFR (GFR > 90 mL/min/1.73 square meters)  •  Stage 2 Mild CKD (GFR = 60-89 mL/min/1.73 square meters)  •  Stage 3A Moderate CKD (GFR = 45-59 mL/min/1.73 square meters)  •  Stage 3B Moderate CKD (GFR = 30-44 mL/min/1.73 square meters)  •  Stage 4 Severe CKD (GFR = 15-29 mL/min/1.73 square meters)  •  Stage 5 End Stage CKD (GFR <15 mL/min/1.73 square meters)  Note: GFR calculation is accurate only with a steady state creatinine    Protime-INR [[id number]]  (Normal) Collected: 07/09/23 2234    Lab Status: Final result Specimen: Blood from Arm, Left Updated: 07/09/23 2309     Protime 11.9 seconds      INR 0.87    APTT [[id number]]  (Normal) Collected: 07/09/23 2234    Lab Status: Final result Specimen: Blood from Arm, Left Updated: 07/09/23 2309     PTT 24 seconds     CBC and differential [[id number]]  (Abnormal) Collected: 07/09/23 2234    Lab Status: Final result Specimen: Blood from Arm, Left Updated: 07/09/23 2257     WBC 10.18 Thousand/uL      RBC 4.59 Million/uL      Hemoglobin 12.1 g/dL      Hematocrit 38.3 %      MCV 83 fL      MCH 26.4 pg      MCHC 31.6 g/dL      RDW 14.6 %      MPV 10.3 fL      Platelets 761 Thousands/uL      nRBC 0 /100 WBCs      Neutrophils Relative 46 %      Immat GRANS % 1 %      Lymphocytes Relative 44 %      Monocytes Relative 7 %      Eosinophils Relative 1 %      Basophils Relative 1 %      Neutrophils Absolute 4.81 Thousands/µL      Immature Grans Absolute 0.07 Thousand/uL      Lymphocytes Absolute 4.44 Thousands/µL      Monocytes Absolute 0.67 Thousand/µL      Eosinophils Absolute 0.12 Thousand/µL      Basophils Absolute 0.07 Thousands/µL                  No orders to display              Procedures  Procedures         ED Course  ED Course as of 07/09/23 2348   Brooks Cee Jul 09, 2023   2348 D-Dimer   2348 APTT   2348 Protime-INR   2499 Basic metabolic panel   1766 CBC and differential(!)                               SBIRT Umair's Most Recent Value   Initial Alcohol Screen: US AUDIT-C     1. How often do you have a drink containing alcohol? 0 Filed at: 07/09/2023 2215   2. How many drinks containing alcohol do you have on a typical day you are drinking? 0 Filed at: 07/09/2023 2215   3a. Male UNDER 65: How often do you have five or more drinks on one occasion? 0 Filed at: 07/09/2023 2215   3b. FEMALE Any Age, or MALE 65+: How often do you have 4 or more drinks on one occassion? 0 Filed at: 07/09/2023 2215   Audit-C Score 0 Filed at: 07/09/2023 2215   OTTO: How many times in the past year have you. .. Used an illegal drug or used a prescription medication for non-medical reasons?  Never Filed at: 07/09/2023 2215                    Medical Decision Making  Is a 51-year-old female presenting to the emergency department complaining of swelling to her right leg that is been bothering her for the past few days, she denies injury or trauma, does have a history of ACL injury remotely, denies any pain today, no risk factors for DVT, no signs of any infection, no rashes or lesions, no tenderness to the calf, negative Homans' sign, D-dimer is negative so DVT is unlikely, no fever or leukocytosis so infectious process unlikely, patient denies any pain and has no tenderness on palpation of the knee, the popliteal space, the knee joint lines, therefore unlikely she has any significant injury to the knee, remainder of labs were unremarkable and discussed with patient at bedside, advised close follow-up with PCP or return if any additional concerns, patient acknowledges understanding and agreement with this plan    Leg swelling: acute illness or injury  Amount and/or Complexity of Data Reviewed  Labs: ordered. Decision-making details documented in ED Course. Disposition  Final diagnoses:   Leg swelling     Time reflects when diagnosis was documented in both MDM as applicable and the Disposition within this note     Time User Action Codes Description Comment    7/9/2023 11:19 PM Promise Crystal Add [M79.89] Leg swelling       ED Disposition     ED Disposition   Discharge    Condition   Stable    Date/Time   Sun Jul 9, 2023 11:19 PM    Comment   Adelia Code discharge to home/self care.                Follow-up Information     Follow up With Specialties Details Why Contact Itzel Burnett, DO General Practice  As needed [address]  [phone]            Discharge Medication List as of 7/9/2023 11:21 PM      CONTINUE these medications which have NOT CHANGED    Details   B Complex-Biotin-FA (SUPER B-100) TABS Take by mouth, Historical Med      calcium carbonate (OS-BISI) 1250 (500 Ca) MG tablet Take 1 tablet by mouth, Historical Med      Cholecalciferol (VITAMIN D3) 13554 units TABS Take 1 tablet by mouth, Historical Med      Cyanocobalamin (B-12 DOTS) 500 MCG TBDP Take 2 tablets by mouth., Starting 3/19/2016, Until Discontinued, Historical Med      folic acid (FOLVITE) 1 mg tablet Take 1 mg by mouth, Historical Med      IRON PO Take 1 tablet by mouth, Starting Thu 12/7/2017, Historical Med      levonorgestrel (MIRENA) 20 MCG/24HR IUD by Intrauterine route., Starting 2/16/2015, Until Discontinued, Historical Med      levothyroxine 75 mcg tablet Take 25 mcg by mouth  , Starting Fri 3/18/2016, Historical Med      Omega-3 Fatty Acids (FISH OIL PO) Take by mouth, Historical Med      !! predniSONE 10 mg tablet Take 3 tabs BID X 2 days, 2 tabs BID X 2 days, 1 tab BID X 2 days, 1 tab daily X 2 days, Normal      !! predniSONE 10 mg tablet 5 tabs once daily x 2 days then decrease by 1 tab every other day until finished, Normal       !! - Potential duplicate medications found. Please discuss with provider. No discharge procedures on file.     PDMP Review     None          ED Provider  Electronically Signed by           Leeroy Sarmiento DO  07/09/23 8099

## 2024-10-31 ENCOUNTER — DOCTOR'S OFFICE (OUTPATIENT)
Dept: URBAN - NONMETROPOLITAN AREA CLINIC 2 | Facility: CLINIC | Age: 39
Setting detail: OPHTHALMOLOGY
End: 2024-10-31
Payer: COMMERCIAL

## 2024-10-31 DIAGNOSIS — H52.13: ICD-10-CM

## 2024-10-31 PROBLEM — H43.393 VITREOUS FLOATERS; BOTH EYES: Status: ACTIVE | Noted: 2024-10-31

## 2024-10-31 PROBLEM — H44.23 MYOPIC DEGENERATION; BOTH EYES: Status: ACTIVE | Noted: 2024-10-31

## 2024-10-31 PROCEDURE — 92310 CONTACT LENS FITTING OU: CPT | Performed by: OPTOMETRIST

## 2024-10-31 PROCEDURE — 92004 COMPRE OPH EXAM NEW PT 1/>: CPT | Performed by: OPTOMETRIST

## 2024-10-31 ASSESSMENT — REFRACTION_MANIFEST
OD_CYLINDER: SPH
OS_CYLINDER: -0.25
OS_VA1: 20/20
OS_AXIS: 155
OD_VA2: 20/20
OD_VA1: 20/20
OS_SPHERE: -9.25
OD_SPHERE: -9.00
OS_VA2: 20/20

## 2024-10-31 ASSESSMENT — REFRACTION_CURRENTRX
OD_SPHERE: -8.25
OS_VPRISM_DIRECTION: SV
OD_AXIS: 045
OD_VPRISM_DIRECTION: SV
OD_OVR_VA: 20/
OS_OVR_VA: 20/
OD_CYLINDER: -0.50
OS_CYLINDER: SPH
OS_SPHERE: -8.50

## 2024-10-31 ASSESSMENT — VISUAL ACUITY
OD_BCVA: 20/20-1
OS_BCVA: 20/20-1

## 2024-10-31 ASSESSMENT — TONOMETRY
OS_IOP_MMHG: 16
OD_IOP_MMHG: 16

## 2024-10-31 ASSESSMENT — CONFRONTATIONAL VISUAL FIELD TEST (CVF)
OS_FINDINGS: FULL
OD_FINDINGS: FULL